# Patient Record
(demographics unavailable — no encounter records)

---

## 2024-10-08 NOTE — HISTORY OF PRESENT ILLNESS
[de-identified] : 58-year-old male had a cortisone injection to his right thumb by myself.  Injection was several weeks ago.  Still having pain and discomfort around the thumb area.  More pain that he had prior to the injection.

## 2024-10-08 NOTE — PHYSICAL EXAM
[de-identified] : Patient is pain along the basal joint.  There is an actual grind present.  There is normal capillary refill.  There is no swelling there is no erythema there is no ecchymoses there is no masses there is no effusion

## 2024-10-08 NOTE — ASSESSMENT
[FreeTextEntry1] : Patient has osteoarthritis right thumb.  Plan will be observation anti-inflammatories bracing out of 5 activity and I do think this situation is going to get better.  Will see us back in 6 weeks to recheck him.

## 2024-11-19 NOTE — ASSESSMENT
[FreeTextEntry1] : Patient is right-sided basal joint arthritis.  Pain got significantly worse around the timeframe of his cortisone injection.  He will try bracing he will try anti-inflammatories she does get some relief when he utilizes ibuprofen try him on Mobic so its once a day bracing was discussed rest natural history of the condition was discussed we discussed the use of steroids secondary cortisone injections which I am not recommending surgical intervention with some not recommending see me back in the new year

## 2024-11-19 NOTE — DATA REVIEWED
[FreeTextEntry1] : Radiographs 3 views of the right hand are reviewed showing degenerative arthritis along the basal joint.

## 2024-11-19 NOTE — PHYSICAL EXAM
[de-identified] : Patient is actual grind right thumb.  Good range of motion of the fingers.  There is no redness or drainage.  No erythema ecchymoses or abrasions.

## 2024-11-19 NOTE — HISTORY OF PRESENT ILLNESS
[de-identified] : 59-year-old male right-sided thumb pain discomfort still.  Is having thumb pain and discomfort Orasone injection his thumb pain and discomfort got worse and he comes in for the evaluation today.  He has tried anti-inflammatories sometimes wears a brace it bothers him with activities if he does not do too much he has less pain and discomfort but it still bothering him significantly left side does not hurt as much post cortisone injection

## 2025-01-21 NOTE — PHYSICAL EXAM
[de-identified] : Patient has an actual grind to both thumbs.  Aper extension deformity.  There is normal sensation normal capillary refill.  There is no Tinel's about the area.  There is no redness or drainage or swelling

## 2025-01-21 NOTE — ASSESSMENT
[FreeTextEntry1] : Patient has bilateral thumb basal joint arthritis.  He received a cortisone injection to both thumbs.  The left one actually helped a little bit to relieve his symptoms the right 1 seems to have exacerbated his symptoms.  He is concerned that I hit a nerve with the injection.  I do not think this is the case.  In the past we discussed MRI testing.  At this time he is reporting that he is getting another opinion.  I do not think that surgical intervention is necessary for his thumb.  I would continue with bracing and anti-inflammatories at this time.  I am available if he would ever want to follow-up

## 2025-01-21 NOTE — HISTORY OF PRESENT ILLNESS
[de-identified] : 59-year-old male still complaining about right sided thumb pain and discomfort.  Patient had basal joint arthritis.  Patient received cortisone injections to both thumbs.  Patient had worse pain in the left thumb compared to the right thumb.  Now he has worse pain in the right thumb than the left thumb.  If he takes meloxicam he gets some relief.  He wears a brace to bed.  He does not really wear a brace during the daytime.  He is still able to lightly play the drums.  But he is bothered by this pain and discomfort.  That he feels as though was not as bad as it currently is right now.

## 2025-05-20 NOTE — ASSESSMENT
[FreeTextEntry1] : 58 yo M with BL hand pain c/w BL basal joint arthritis and right CTS (2002).   - recommend EDS/EMG - repeat hand X-Ray - or bring disc  - continue Voltaren topically  - all his questions were answered  - f/u after studies

## 2025-05-20 NOTE — PHYSICAL EXAM
[de-identified] : well-developed male, NAD [de-identified] : NC/AT [de-identified] : supple [de-identified] : nonlabored breathing  [de-identified] : MIGUELR [de-identified] : soft, nontender  [de-identified] : BL TTP over basal joints, + right grind test, +Tinel and Phalen sign R>L

## 2025-06-10 NOTE — PHYSICAL EXAM
[de-identified] : well-developed male, NAD [de-identified] : NC/AT [de-identified] : supple [de-identified] : nonlabored breathing  [de-identified] : MIGUELR [de-identified] : BL TTP over basal joints, + right grind test, +Tinel and Phalen sign R>L [de-identified] : soft, nontender

## 2025-06-10 NOTE — ASSESSMENT
[FreeTextEntry1] : 60 yo M with BL hand pain c/w BL basal joint arthritis and right CTS (2002).   - recommend EDS/EMG - repeat hand X-Ray - or bring disc  - continue Voltaren topically  - all his questions were answered  - f/u after studies   6/10/2025 as above EMG reviewed--severe left CTS, no compression seen on rigth side  xrays reviewed on outside disc--B/L moderate BJA  pt more symptomatic on right side  lengthy discussion re options to treat disabling right thumb pain: -fat grafting vs basaljoint arthroplasty  Regarding the hand surgery, we discussed the risk of bleeding, infection, need for additional unplanned surgery, need for postoperative hand occupational therapy, possible lack of improvement and diminished hand function.  We discussed prolonged recovery.  All questions were answered and all risks were well understood by the patient.  Patient explanation for management options  pt elects for fat grafting as first step  pt aware possible lack of improvement   I was present with the Resident/PA during the key portions of the history and exam.  I discussed the case with the Resident/PA and agree with the findings and plan as documented in the Resident's/PA's note, unless noted below.

## 2025-06-10 NOTE — HISTORY OF PRESENT ILLNESS
[FreeTextEntry1] : 60 yo M with PMHx of COPD, GERD, OA, Anxiety and HLD who is RHD and presents today for evaluation of bilateral hand pain and numbness for the past 1-2 years. Pt has a known h/o right CTS (EMG in 2022) and b/l basal joint arthritis s/p few steroid injections with Dr. Emery with no significant improvement. He c/o worsening thumb pain R>>L, hand weakness, dropping things occasionally, limited ROM. Has been wearing right thumb spica brace with some relief.    Occupation - construction, plays drums also Former smoker, quit 2005